# Patient Record
(demographics unavailable — no encounter records)

---

## 2025-05-29 NOTE — PHYSICAL EXAM
[General Appearance - Well Developed] : well developed [General Appearance - Well Nourished] : well nourished [Enlarged Base of the Tongue] : enlargement of the base of the tongue [III] : III [Heart Sounds] : normal S1 and S2 [Murmurs] : no murmurs [Auscultation Breath Sounds / Voice Sounds] : lungs were clear to auscultation bilaterally [] : no respiratory distress [No Focal Deficits] : no focal deficits [Oriented To Time, Place, And Person] : oriented to person, place, and time [Memory Recent] : recent memory was not impaired [FreeTextEntry1] : no edema

## 2025-05-29 NOTE — HISTORY OF PRESENT ILLNESS
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  Patient was diagnosed with sleep apnea years ago and failed CPAP therapy. He subsequently underwent inspire implantation ( march 2022). Inspire titration showed that he has benefitted with 1.9 v +-+ configuration.   Patient is not sleepy with Flanagan sleepiness score of 6.  Patients snoring improved, does not have any witnessed apneas.  Patient's bedtime is around 11 PM wakes up in the morning around 7 AM.  He feels rested when he wakes up.  Patient drinks 1 cup of coffee during the daytime. Patient does not have any headaches or nocturia.  He is not sleepy while driving.  He has significant insomnia and takes thc based compound along with other over the counter medications to help with sleep. He also has seen hyponosis specialist for insomnia.  Inspire usage about 6 hr 40 min on average settings 1 to 2 v , polarity +-+  he has lost significant weight since the last time I have seen him, with the weight loss he is not able to tolerate higher inspire settings.  inspire titration study 1.3 v- ahi of 4.7  His ahi is 9 on 1.3 v on watch pat study. He said that the remote stopped working suddenly, today we ran tests on his device.  The patient underwent interrogation and complex programming of the hypoglossal nerve stimulator device implantable pulse generator. Device settings were obtained from the device and evaluated. Extensive setting adjustment was made in order to improve device function. Upon conclusion of interrogation and programming, the device was confirmed to be functioning appropriately. The patient tolerated procedure well, and there are no complications. wave forms and impedences testing showed device is functioning normally.  I assured him and suggested to continue to use the inspire.